# Patient Record
Sex: FEMALE | Race: WHITE | NOT HISPANIC OR LATINO | Employment: UNEMPLOYED | URBAN - METROPOLITAN AREA
[De-identification: names, ages, dates, MRNs, and addresses within clinical notes are randomized per-mention and may not be internally consistent; named-entity substitution may affect disease eponyms.]

---
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
## 2025-05-14 NOTE — PROGRESS NOTES
Daily Note     Today's date: 2019  Patient name: Bozena Silverman  : 2005  MRN: [id number]  Referring provider: Lorraine Carbajal PA-C  Dx:   Encounter Diagnosis     ICD-10-CM    1  Acute pain of left knee M25 562    2  Closed displaced fracture of spine of left tibia S82 112A                 Subjective: Reports bilateral LE soreness after last session     Objective: See treatment diary below      Precautions: tibia fracture     Daily Treatment Diary      Manuals    manual stretching 25' 20' 15' 15' 10' 8'      25'                                                   Exercise Diary                Heel slides               HS stretch               Quad set               Ball squeeze               SAQ               Hr/tr               squats on leg press   2 up 1 down 20# 3x10 10#  3x10 10#  3x10 10#  3x10 20#, 3x8 20#, 3x8      10#   3x10   marching               pball bridge with ham curl  3x10 3x10  3 way SLDL w/ cones 2#, 2x5         lunges               TKE standing  green  3x10  blue  3x10 Walkback on apollo, 30# x20 walback, 30# x20         slr flexion               Prone quad stretch    30"x2           bike               pball wall squat with tb around knees     Single leg squat, 2x5x5" SLS, 2x6x5", 2#         sidestepping  green  12ft x10 green  12ft x10            Step ups     Lunge w/ external focus, 3x8 Lunge w/ external focus , 3x6, 2#         Single leg bridge L foot on blue foam               slr abd 30 3x10 3x10         3x10   clamshell Green tb x30 green  3x10          green   3x10   SLS               stepper 5' speed 40 5' speed 30 5'  Speed  30 5' speed  30 5' speed 50 5' speed 35      5 min  speed 40    supine SLR/ER               Bridge with clamshell  green  3x10             standing slr abd stand on blue foam  green  20 ea green  1x20 ea            Step downs   10 inch  1x30 10 inch  1x30          sls on bosu with rebounder ball toss   2# ball  1x30 2# ball  1x30          Box jumps on and off    8 inch  1x15 8", x20 w/ external focus - cones 8#, x20 w/ external focus        Side shuffling    12ft x6          Jogging forward/  backward    12ft x6 NV         Modalities               MHP with prone hang 5# weight 10 min           5 lb weight  8 min   CP with ext hang               NMES                Assessment: Despite reported increased muscle soreness, pt completed exercise without reports of LE pain  Demonstrated moderately increased LE mechanics this session, only requiring occasional VC to correct technique  Able to progress in resistance exercises this session  Pt would benefit from skilled PT services to reduce pain and increase level of function  Plan: Continue treatment as per PT plan of care

## (undated) DEVICE — SUT ORTHOCORD M06

## (undated) DEVICE — SUT MONOCRYL 3-0 PS-2 18 IN Y497G

## (undated) DEVICE — SUT TIGERSTICK TIGERWIRE 50IN WHITE/BLACK

## (undated) DEVICE — PENCIL ELECTROSURG E-Z CLEAN -0035H

## (undated) DEVICE — VAPR COOLPULSE 90 ELECTRODE 90 DEGREES SUCTION WITH INTEGRATED HANDPIECE: Brand: VAPR COOLPULSE

## (undated) DEVICE — SUT FIBERWIRE 2-0 3/8 CIRCLE T-13 18 IN AR-7220

## (undated) DEVICE — PUDDLE VAC

## (undated) DEVICE — BETHLEHEM UNIVERSAL  ARTHRO PK: Brand: CARDINAL HEALTH

## (undated) DEVICE — GAUZE SPONGES,16 PLY: Brand: CURITY

## (undated) DEVICE — SUT FIBERSTICK #2 50IN BLUE

## (undated) DEVICE — CHLORAPREP HI-LITE 26ML ORANGE

## (undated) DEVICE — PADDING CAST 4 IN  COTTON STRL

## (undated) DEVICE — CUTTER FLIPCUTTER II SHORT 8MM

## (undated) DEVICE — LIGHT GLOVE GREEN

## (undated) DEVICE — SUT 2 ORTHOCORD 223114

## (undated) DEVICE — NEEDLE 22 G X 1 1/2 SAFETY

## (undated) DEVICE — TUBING SUCTION 5MM X 12 FT

## (undated) DEVICE — INTENDED FOR TISSUE SEPARATION, AND OTHER PROCEDURES THAT REQUIRE A SHARP SURGICAL BLADE TO PUNCTURE OR CUT.: Brand: BARD-PARKER ® CARBON RIB-BACK BLADES

## (undated) DEVICE — BLADE SHAVER CUTTER BN 4MM 13CM COOLCUT

## (undated) DEVICE — ARTHROSCOPY FLOOR MAT

## (undated) DEVICE — LIGHT HANDLE COVER SLEEVE DISP BLUE STELLAR

## (undated) DEVICE — GLOVE INDICATOR PI UNDERGLOVE SZ 8.5 BLUE

## (undated) DEVICE — DRAPE SHEET X-LG

## (undated) DEVICE — GLOVE SRG BIOGEL 8.5

## (undated) DEVICE — TUBING ARTHROSCOPY REDUCE PATIENT

## (undated) DEVICE — TIBURON EXTREMITY SHEET: Brand: CONVERTORS

## (undated) DEVICE — SYRINGE 20ML LL

## (undated) DEVICE — ACE WRAP 6 IN UNSTERILE

## (undated) DEVICE — ABDOMINAL PAD: Brand: DERMACEA

## (undated) DEVICE — ACE WRAP 4 IN UNSTERILE

## (undated) DEVICE — IMPERVIOUS STOCKINETTE: Brand: DEROYAL

## (undated) DEVICE — 3M™ STERI-STRIP™ REINFORCED ADHESIVE SKIN CLOSURES, R1547, 1/2 IN X 4 IN (12 MM X 100 MM), 6 STRIPS/ENVELOPE: Brand: 3M™ STERI-STRIP™

## (undated) DEVICE — SUT VICRYL 0 CT-2 18 IN J727D